# Patient Record
Sex: MALE | Race: WHITE | Employment: OTHER | URBAN - METROPOLITAN AREA
[De-identification: names, ages, dates, MRNs, and addresses within clinical notes are randomized per-mention and may not be internally consistent; named-entity substitution may affect disease eponyms.]

---

## 2017-10-10 ENCOUNTER — TELEPHONE (OUTPATIENT)
Dept: INTERNAL MEDICINE CLINIC | Age: 67
End: 2017-10-10

## 2017-10-10 NOTE — TELEPHONE ENCOUNTER
Please call patient and find out if he has a new PCP. Last seen by Mihaela Mckeon in August 2015. If so, please process him out.

## 2020-07-07 ENCOUNTER — HOSPITAL ENCOUNTER (OUTPATIENT)
Dept: PHYSICAL THERAPY | Age: 70
Discharge: HOME | End: 2020-07-07
Payer: MEDICARE

## 2020-07-07 PROCEDURE — 97530 THERAPEUTIC ACTIVITIES: CPT

## 2020-07-07 PROCEDURE — 97162 PT EVAL MOD COMPLEX 30 MIN: CPT

## 2020-07-07 NOTE — PROGRESS NOTES
PT DAILY TREATMENT NOTE 10-18    Patient Name: Jayde Thakkar. Date:2020  : 1950  [x]  Patient  Verified  Payor: BLUE CROSS MEDICARE / Plan: [place] / Product Type: Managed Care Medicare /    In time:4:20  Out time:5:00  Total Treatment Time (min): 40  Visit #: 1 of 10      Medicare/BCBS Only   Total Timed Codes (min):  15 1:1 Treatment Time:  40       Treatment Area: Right shoulder pain [M25.511]     SUBJECTIVE  Pain Level (0-10 scale): 210  Any medication changes, allergies to medications, adverse drug reactions, diagnosis change, or new procedure performed?: [x] No    [] Yes (see summary sheet for update)  Subjective functional status/changes:   [x] See paper initial evaluation form in patient chart. OBJECTIVE    25 min [x]Eval                  []Re-Eval     15 min Therapeutic Activity:  [] See flow sheet : diagnosis; prognosis; HEP given and reviewed with Pt   Rationale: increase ROM and increase strength to improve the patients ability to be able to raise right UE overhead without increased pain    With   [] TE   [x] TA   [] neuro   [] other: Patient Education: [x] Review HEP    [] Progressed/Changed HEP based on:   [] positioning   [] body mechanics   [] transfers   [] heat/ice application    [] other:      Other Objective/Functional Measures: FOTO 46 pts     Pain Level (0-10 scale) post treatment: 210    ASSESSMENT/Changes in Function:     Patient will continue to benefit from skilled PT services to address functional mobility deficits, address ROM deficits, address strength deficits, analyze and address soft tissue restrictions, analyze and cue movement patterns, analyze and modify body mechanics/ergonomics, assess and modify postural abnormalities and instruct in home and community integration to attain remaining goals.      [x]  See Plan of Care  []  See progress note/recertification  []  See Discharge Summary         PLAN  []  Upgrade activities as tolerated [x]  Continue plan of care  []  Update interventions per flow sheet       []  Discharge due to:_  []  Other:_      Db Hunt, PT 7/7/2020  5:47 PM

## 2020-07-07 NOTE — PROGRESS NOTES
[facility]  [address]  [place], [address]  [phone] fax    Plan of Care/ Statement of Necessity for Physical Therapy Services    Patient name: Peter Dong. Start of Care: 2020   Referral source: Paolo Mcconnell MD : 1950    Medical Diagnosis: Right shoulder pain [M25.511]  Payor: BLUE CROSS MEDICARE / Plan: [place] [employer] CR[place] MEDIC[place] PPO / Product Type: Managed Care Medicare /  Onset Date:2020    Treatment Diagnosis: Right Shoulder pain   Prior Hospitalization: see medical history Provider#: [id number]   Medications: Verified on Patient summary List    Comorbidities: BMI > 30; COPD; HTN; hx Polio; Latex allergy   Prior Level of Function: Retired; lives with spouse; functionally independent; uses [facility] for balance      The Plan of Care and following information is based on the information from the initial evaluation. Assessment/ key information: Pt is a 65 y.o. male who presents with c/o right superior shoulder pain that has persisted since possibly 2020. Pt was a fair historian, reporting that he tripped and fell over a box and landed on outstretched right arm, but uncertain of date of occurrence. Pt notes self-treating with rest and noted reduction in overall pain but continued difficulty with raising right arm overhead, lifting objects, and lying on right side at times. Upon exam, Pt exhibited slouched posture, corrected with verbal cueing; limited right shoulder AROM with pain, clavicular and scapular hypomobility, and impaired strength. Pt would benefit from skilled PT to address above deficits to improve Pt's function and ability to return to premorbid status without pain or limitations.     Evaluation Complexity History MEDIUM  Complexity : 1-2 comorbidities / personal factors will impact the outcome/ POC ; Examination MEDIUM Complexity : 3 Standardized tests and measures addressing body structure, function, activity limitation and / or participation in recreation  ;Presentation MEDIUM Complexity : Evolving with changing characteristics  ; Clinical Decision Making MEDIUM Complexity : FOTO score of 26-74  Overall Complexity Rating: MEDIUM  Problem List: pain affecting function, decrease ROM, decrease strength, edema affecting function, decrease ADL/ functional abilitiies, decrease activity tolerance and decrease flexibility/ joint mobility   Treatment Plan may include any combination of the following: Therapeutic exercise, Therapeutic activities, Neuromuscular re-education, Physical agent/modality, Manual therapy, Patient education, Self Care training and Functional mobility training  Patient / Family readiness to learn indicated by: asking questions and interest  Persons(s) to be included in education: patient (P)  Barriers to Learning/Limitations: None  Patient Goal (s): Arm recovery.   Patient Self Reported Health Status: good  Rehabilitation Potential: good    Short Term Goals: To be accomplished in 1 weeks:  Goal: Pt to be compliant with initial HEP to improve right shoulder mobility for ease of raising arm overhead. Status at last note/certification: Established and reviewed with Pt  Long Term Goals: To be accomplished in 5 weeks:  Goal: Pt to increase right shoulder Flex/ABD AROM to at least 130 deg without increased pain for ease reaching into cabinets, onto upper shelves. Status at last note/certification: Flex 371 deg, ABD 90 deg - with pain  Goal: Pt to increase right shoulder strength in Flex/ABD, ER to 5/5 grossly without pain for ease of lifting moderate weight to move boxes in home. Status at last note/certification: 4/5 grossly with pain  Goal: Pt to report < 3/10 pain at worst to increase ease with ADLs. Status at last note/certification: 0/43 pain at worst  Goal: Pt to report FOTO score of 68 pts to show improved function and quality of life.   Status at last note/certification: FOTO 46 pts     Frequency / Duration: Patient to be seen 2 times per week for 5 weeks. Patient/ Caregiver education and instruction: Diagnosis, prognosis, exercises   [x]  Plan of care has been reviewed with PTA    Certification Period: 7/7/20 - 8/5/20  Natalie Hunt, PT 7/7/2020 5:54 PM    ________________________________________________________________________    I certify that the above Therapy Services are being furnished while the patient is under my care. I agree with the treatment plan and certify that this therapy is necessary.     [de-identified] Signature:____________________  Date:____________Time: _________    Please sign and return to [facility] [address] Andrey Caballero [address] [place] [address] [address]  [phone] fax

## 2020-07-10 ENCOUNTER — HOSPITAL ENCOUNTER (OUTPATIENT)
Dept: PHYSICAL THERAPY | Age: 70
Discharge: HOME | End: 2020-07-10
Payer: MEDICARE

## 2020-07-10 PROCEDURE — 97110 THERAPEUTIC EXERCISES: CPT

## 2020-07-10 NOTE — PROGRESS NOTES
PT DAILY TREATMENT NOTE 10-18    Patient Name: Ellen Evangelista. Date:7/10/2020  : 1950  [x]  Patient  Verified  Payor: BLUE CROSS MEDICARE / Plan: [place] BL[place]OSS MEDIC[place] PPO / Product Type: Managed Care Medicare /    In time:9:30  Out time:10:16  Total Treatment Time (min): 46  Visit #: 2 of 10    Medicare/BCBS Only   Total Timed Codes (min):  36 1:1 Treatment Time:  15       Treatment Area: Right shoulder pain [M25.511]    SUBJECTIVE  Pain Level (0-10 scale): 2-3/10  Any medication changes, allergies to medications, adverse drug reactions, diagnosis change, or new procedure performed?: [x] No    [] Yes (see summary sheet for update)  Subjective functional status/changes:   [] No changes reported  \"I just have a little pain just from rushing this morning. \"    OBJECTIVE    Modality rationale: decrease inflammation and decrease pain to improve the patients ability to raise arm with ease   Min Type Additional Details    [] Estim:  []Unatt       []IFC  []Premod                        []Other:  []w/ice   []w/heat  Position:  Location:    [] Estim: []Att    []TENS instruct  []NMES                    []Other:  []w/US   []w/ice   []w/heat  Position:  Location:    []  Traction: [] Cervical       []Lumbar                       [] Prone          []Supine                       []Intermittent   []Continuous Lbs:  [] before manual  [] after manual    []  Ultrasound: []Continuous   [] Pulsed                           []1MHz   []3MHz W/cm2:  Location:    []  Iontophoresis with dexamethasone         Location: [] Take home patch   [] In clinic   10 [x]  Ice     []  heat  []  Ice massage  []  Laser   []  Anodyne Position:seated  Location: right shoulder    []  Laser with stim  []  Other:  Position:  Location:    []  Vasopneumatic Device Pressure:       [] lo [] med [] hi   Temperature: [] lo [] med [] hi   [x] Skin assessment post-treatment:  [x]intact []redness- no adverse reaction    []redness  adverse reaction:     36 min Therapeutic Exercise:  [] See flow sheet :   Rationale: increase ROM and increase strength to improve the patients ability to reach overhead without difficulty          With   [] TE   [] TA   [] neuro   [] other: Patient Education: [x] Review HEP    [] Progressed/Changed HEP based on:   [] positioning   [] body mechanics   [] transfers   [] heat/ice application    [] other:      Other Objective/Functional Measures: Pt 30 minutes late for appt. Initiated treatment program per flow sheet - abbreviated. Pain Level (0-10 scale) post treatment: 0.5/10    ASSESSMENT/Changes in Function: Pt arrived late thus time constraints on program completion. Pt required constant cueing to stay on task and to perform exercises properly. Pt advised to inform PT if pain levels increased above 5/10 and to not force movements if painful. Applied cold pack to assist with pain relief. Pt able to report reduction in pain level after modalities. Pt left session in no apparent distress. Patient will continue to benefit from skilled PT services to address functional mobility deficits, address ROM deficits, address strength deficits, analyze and address soft tissue restrictions, analyze and cue movement patterns, analyze and modify body mechanics/ergonomics, assess and modify postural abnormalities and instruct in home and community integration to attain remaining goals. []  See Plan of Care  []  See progress note/recertification  []  See Discharge Summary         Progress towards goals / Updated goals:  Short Term Goals: To be accomplished in 1 weeks:  Goal: Pt to be compliant with initial HEP to improve right shoulder mobility for ease of raising arm overhead. Status at last note/certification: Established and reviewed with Pt  Current status: progressing - Pt has initiated HEP  Long Term Goals:  To be accomplished in 5 weeks:  Goal: Pt to increase right shoulder Flex/ABD AROM to at least 130 deg without increased pain for ease reaching into cabinets, onto upper shelves. Status at last note/certification: Flex 089 deg, ABD 90 deg - with pain  Goal: Pt to increase right shoulder strength in Flex/ABD, ER to 5/5 grossly without pain for ease of lifting moderate weight to move boxes in home. Status at last note/certification: 4/5 grossly with pain  Goal: Pt to report < 3/10 pain at worst to increase ease with ADLs. Status at last note/certification: 7/99 pain at worst  Goal: Pt to report FOTO score of 68 pts to show improved function and quality of life.   Status at last note/certification: FOTO 46 pts     PLAN  []  Upgrade activities as tolerated     [x]  Continue plan of care  []  Update interventions per flow sheet       []  Discharge due to:_  []  Other:_      Karen Hunt, PT 7/10/2020  9:28 AM    Future Appointments   Date Time Provider Meggan Mcclure   7/14/2020  3:45 PM Kristy Oreilly, PT [facility]   7/16/2020  3:00 PM Kristy Oreilly, PT [facility]   7/21/2020  2:15 PM Verlon Canal [facility]   7/23/2020  2:15 PM Kristy Oreilly, PT [facility]   7/28/2020  2:15 PM Verlon Canal [facility]   7/30/2020  2:15 PM Kristy Oreilly, PT [facility]

## 2020-07-14 ENCOUNTER — HOSPITAL ENCOUNTER (OUTPATIENT)
Dept: PHYSICAL THERAPY | Age: 70
Discharge: HOME | End: 2020-07-14
Payer: MEDICARE

## 2020-07-14 PROCEDURE — 97140 MANUAL THERAPY 1/> REGIONS: CPT

## 2020-07-14 PROCEDURE — 97110 THERAPEUTIC EXERCISES: CPT

## 2020-07-14 NOTE — PROGRESS NOTES
PT DAILY TREATMENT NOTE 10-18    Patient Name: Bob Paige. Date:2020  : 1950  [x]  Patient  Verified  Payor: BLUE CROSS MEDICARE / Plan: [place] [employer] CROSS MEDICARE PPO / Product Type: Managed Care Medicare /    In time:345  Out time:445  Total Treatment Time (min): 60  Visit #: 3 of 10    Medicare/BCBS Only   Total Timed Codes (min):  50 1:1 Treatment Time:  47       Treatment Area: Right shoulder pain [M25.511]    SUBJECTIVE  Pain Level (0-10 scale): 2  Any medication changes, allergies to medications, adverse drug reactions, diagnosis change, or new procedure performed?: [x] No    [] Yes (see summary sheet for update)  Subjective functional status/changes:   [] No changes reported  I feel on Saturday. My cane got caught between my legs and I landed on the Right side.      OBJECTIVE    Modality rationale: decrease inflammation and decrease pain to improve the patients ability to reduce swelling after exercises   Min Type Additional Details    [] Estim:  []Unatt       []IFC  []Premod                        []Other:  []w/ice   []w/heat  Position:  Location:    [] Estim: []Att    []TENS instruct  []NMES                    []Other:  []w/US   []w/ice   []w/heat  Position:  Location:    []  Traction: [] Cervical       []Lumbar                       [] Prone          []Supine                       []Intermittent   []Continuous Lbs:  [] before manual  [] after manual    []  Ultrasound: []Continuous   [] Pulsed                           []1MHz   []3MHz W/cm2:  Location:    []  Iontophoresis with dexamethasone         Location: [] Take home patch   [] In clinic   10 [x]  Ice     []  heat  []  Ice massage  []  Laser   []  Anodyne Position:seated  Location:Right shoulder    []  Laser with stim  []  Other:  Position:  Location:    []  Vasopneumatic Device Pressure:       [] lo [] med [] hi   Temperature: [] lo [] med [] hi   [] Skin assessment post-treatment:  []intact []redness- no adverse reaction []redness  adverse reaction:     37 min Therapeutic Exercise:  [x] See flow sheet :   Rationale: increase ROM and increase strength to improve the patients ability to improve ease of overhead tasks    13 min Manual Therapy:  Supine Right shoulder manual stretching into elevation with long axis distraction, oscillations, STM/TrP release to Right UT, sidelying STM/TrP release to Right medial scapular border/infraspinatus   Rationale: decrease pain, increase ROM and increase tissue extensibility to improve ease of ADLs       With   [] TE   [] TA   [] neuro   [] other: Patient Education: [x] Review HEP    [] Progressed/Changed HEP based on:   [] positioning   [] body mechanics   [] transfers   [] heat/ice application    [] other:      Other Objective/Functional Measures: added in manual interventions due to recent fall; increased Right shoulder pain during sidelying abduction at 60 degrees     Pain Level (0-10 scale) post treatment: 1    ASSESSMENT/Changes in Function: Despite recent fall Patient demonstrates decent Right shoulder PROM during manual interventions, however goes guard (he admits to guarding even when not in pain). Good AAROM noted during shoulder ladder flexion. Cueing needed for proper posture awareness during resistance band activities. Patient will continue to benefit from skilled PT services to modify and progress therapeutic interventions, address functional mobility deficits, address ROM deficits, address strength deficits, analyze and address soft tissue restrictions, analyze and cue movement patterns, analyze and modify body mechanics/ergonomics, assess and modify postural abnormalities and instruct in home and community integration to attain remaining goals.      []  See Plan of Care  []  See progress note/recertification  []  See Discharge Summary         Progress towards goals / Updated goals:  Short Term Goals: To be accomplished in 1 weeks:  Goal: Pt to be compliant with initial HEP to improve right shoulder mobility for ease of raising arm overhead. Status at last note/certification: Established and reviewed with Pt  Current status: progressing - Pt has initiated HEP  Long Term Goals: To be accomplished in 5 weeks:  Goal: Pt to increase right shoulder Flex/ABD AROM to at least 130 deg without increased pain for ease reaching into cabinets, onto upper shelves. Status at last note/certification: Flex 705 deg, ABD 90 deg - with pain  Goal: Pt to increase right shoulder strength in Flex/ABD, ER to 5/5 grossly without pain for ease of lifting moderate weight to move boxes in home. Status at last note/certification: 4/5 grossly with pain  Goal: Pt to report < 3/10 pain at worst to increase ease with ADLs. Status at last note/certification: 6/10 pain at worst  Goal: Pt to report FOTO score of 68 pts to show improved function and quality of life.   Status at last note/certification: OEVC 13 FBJ     PLAN  [x]  Upgrade activities as tolerated     [x]  Continue plan of care  []  Update interventions per flow sheet       []  Discharge due to:_  []  Other:_      Cedric Fenton PT 7/14/2020  3:45 PM    Future Appointments   Date Time Provider Meggan Mcclure   7/16/2020  3:00 PM Souleymane Champagne, PT [facility]   7/21/2020  2:15 PM Haritha Cedeno [facility]   7/23/2020  2:15 PM Souleymane Champagne, PT [facility]   7/28/2020  2:15 PM Haritha Cedeno [facility]   7/30/2020  2:15 PM Souleymane Champagne, PT [facility]

## 2020-07-16 ENCOUNTER — HOSPITAL ENCOUNTER (OUTPATIENT)
Dept: PHYSICAL THERAPY | Age: 70
Discharge: HOME | End: 2020-07-16
Payer: MEDICARE

## 2020-07-16 PROCEDURE — 97110 THERAPEUTIC EXERCISES: CPT

## 2020-07-16 PROCEDURE — 97140 MANUAL THERAPY 1/> REGIONS: CPT

## 2020-07-16 NOTE — PROGRESS NOTES
PT DAILY TREATMENT NOTE 10-18    Patient Name: Ellen Evangelista. Date:2020  : 1950  [x]  Patient  Verified  Payor: BLUE CROSS MEDICARE / Plan: [place]IC[place] PPO / Product Type: Managed Care Medicare /    In time:305  Out time:410  Total Treatment Time (min): 72  Visit #: 4 of 10    Medicare/BCBS Only   Total Timed Codes (min):  55 1:1 Treatment Time:  42       Treatment Area: Right shoulder pain [M25.511]    SUBJECTIVE  Pain Level (0-10 scale): 1-2  Any medication changes, allergies to medications, adverse drug reactions, diagnosis change, or new procedure performed?: [x] No    [] Yes (see summary sheet for update)  Subjective functional status/changes:   [] No changes reported  I had to take an Advil to fall asleep.      OBJECTIVE    Modality rationale: decrease inflammation and decrease pain to improve the patients ability to reduce shoulder pain after exercises   Min Type Additional Details    [] Estim:  []Unatt       []IFC  []Premod                        []Other:  []w/ice   []w/heat  Position:  Location:    [] Estim: []Att    []TENS instruct  []NMES                    []Other:  []w/US   []w/ice   []w/heat  Position:  Location:    []  Traction: [] Cervical       []Lumbar                       [] Prone          []Supine                       []Intermittent   []Continuous Lbs:  [] before manual  [] after manual    []  Ultrasound: []Continuous   [] Pulsed                           []1MHz   []3MHz W/cm2:  Location:    []  Iontophoresis with dexamethasone         Location: [] Take home patch   [] In clinic   10 [x]  Ice     []  heat  []  Ice massage  []  Laser   []  Anodyne Position:seated  Location:Right shoulder    []  Laser with stim  []  Other:  Position:  Location:    []  Vasopneumatic Device Pressure:       [] lo [] med [] hi   Temperature: [] lo [] med [] hi   [] Skin assessment post-treatment:  []intact []redness- no adverse reaction    []redness  adverse reaction:     43 min Therapeutic Exercise:  [x] See flow sheet :   Rationale: increase ROM and increase strength to improve the patients ability to perform ADLs, ease of household tasks    12 min Manual Therapy:  Supine Right shoulder manual stretching all planes with long axis distraction and oscillations, grade I-II Right GHJ mobs inferior, lateral subscapularis release, sidelying Right scapular manual stretching   Rationale: decrease pain, increase ROM and increase tissue extensibility to improve shoulder mobility with functional tasks        With   [] TE   [] TA   [] neuro   [] other: Patient Education: [x] Review HEP    [] Progressed/Changed HEP based on:   [] positioning   [] body mechanics   [] transfers   [] heat/ice application    [] other:      Other Objective/Functional Measures: added sidelying flexion      Pain Level (0-10 scale) post treatment: 1    ASSESSMENT/Changes in Function: Patient demonstrates improving passive and active mobility this afternoon. In sidelying he reports some increased shoulder pain with hand resting on stomach after external rotation exercise. He does admit to taking over the counter Aspirin to reduce pain in order to improve tolerance to sleeping. Patient will continue to benefit from skilled PT services to modify and progress therapeutic interventions, address functional mobility deficits, address ROM deficits, address strength deficits, analyze and address soft tissue restrictions, analyze and cue movement patterns, analyze and modify body mechanics/ergonomics, assess and modify postural abnormalities and instruct in home and community integration to attain remaining goals. []  See Plan of Care  []  See progress note/recertification  []  See Discharge Summary         Progress towards goals / Updated goals:  Short Term Goals: To be accomplished in 1 weeks:  Goal: Pt to be compliant with initial HEP to improve right shoulder mobility for ease of raising arm overhead.   Status at last note/certification: Established and reviewed with Pt  Current status: progressing - Pt has initiated HEP  Long Term Goals: To be accomplished in 5 weeks:  Goal: Pt to increase right shoulder Flex/ABD AROM to at least 130 deg without increased pain for ease reaching into cabinets, onto upper shelves. Status at last note/certification: Flex 384 deg, ABD 90 deg - with pain  Current status: reassess closer to MD note   Goal: Pt to increase right shoulder strength in Flex/ABD, ER to 5/5 grossly without pain for ease of lifting moderate weight to move boxes in home. Status at last note/certification: 4/5 grossly with pain  Current status: reassess closer to MD note    Goal: Pt to report < 3/10 pain at worst to increase ease with ADLs. Status at last note/certification: 6/10 pain at worst  Current status: not met, 8/10 (recent fall)  Goal: Pt to report FOTO score of 68 pts to show improved function and quality of life.   Status at last note/certification: BUEJ 73 PRE  Current status: reassess at MD note     PLAN  [x]  Upgrade activities as tolerated     [x]  Continue plan of care  []  Update interventions per flow sheet       []  Discharge due to:_  []  Other:_      Minna Rain, PT 7/16/2020  3:02 PM    Future Appointments   Date Time Provider Meggan Mcclure   7/21/2020  2:15 PM Una Lewis, PT [facility] [address]   7/23/2020  2:15 PM Una Lewis, PT [facility] [address]   7/28/2020  2:15 PM Lachelle [address]   7/30/2020  2:15 PM Una Lewis, PT [facility] [address]

## 2020-07-21 ENCOUNTER — HOSPITAL ENCOUNTER (OUTPATIENT)
Dept: PHYSICAL THERAPY | Age: 70
Discharge: HOME | End: 2020-07-21
Payer: MEDICARE

## 2020-07-21 PROCEDURE — 97110 THERAPEUTIC EXERCISES: CPT

## 2020-07-21 PROCEDURE — 97140 MANUAL THERAPY 1/> REGIONS: CPT

## 2020-07-21 NOTE — PROGRESS NOTES
PT DAILY TREATMENT NOTE 10-18    Patient Name: Anabela Sousa. Date:2020  : 1950  [x]  Patient  Verified  Payor: BLUE CROSS MEDICARE / Plan: [place] / Product Type: Managed Care Medicare /    In time:211  Out time:312  Total Treatment Time (min): 61  Visit #: 5 of 10    Medicare/BCBS Only   Total Timed Codes (min):  51 1:1 Treatment Time:  49       Treatment Area: Right shoulder pain [M25.511]    SUBJECTIVE  Pain Level (0-10 scale): 1-2  Any medication changes, allergies to medications, adverse drug reactions, diagnosis change, or new procedure performed?: [x] No    [] Yes (see summary sheet for update)  Subjective functional status/changes:   [] No changes reported  Right now it's not too bad but it was bothering me earlier.      OBJECTIVE    Modality rationale: decrease inflammation and decrease pain to improve the patients ability to reduce shoulder pain after exercises    Min Type Additional Details    [] Estim:  []Unatt       []IFC  []Premod                        []Other:  []w/ice   []w/heat  Position:  Location:    [] Estim: []Att    []TENS instruct  []NMES                    []Other:  []w/US   []w/ice   []w/heat  Position:  Location:    []  Traction: [] Cervical       []Lumbar                       [] Prone          []Supine                       []Intermittent   []Continuous Lbs:  [] before manual  [] after manual    []  Ultrasound: []Continuous   [] Pulsed                           []1MHz   []3MHz W/cm2:  Location:    []  Iontophoresis with dexamethasone         Location: [] Take home patch   [] In clinic   10 [x]  Ice     []  heat  []  Ice massage  []  Laser   []  Anodyne Position:seated  Location:Right shoulder    []  Laser with stim  []  Other:  Position:  Location:    []  Vasopneumatic Device Pressure:       [] lo [] med [] hi   Temperature: [] lo [] med [] hi   [] Skin assessment post-treatment:  []intact []redness- no adverse reaction    []redness  adverse reaction:     39 min Therapeutic Exercise:  [x] See flow sheet :   Rationale: increase ROM and increase strength to improve the patients ability to perform ADLs, improve ease of reaching overhead    12 min Manual Therapy:  Supine Right shoulder manual stretching all planes with long axis distraction and oscillations, grade I-II Right GHJ mobs inferior, lateral subscapularis release, sidelying Right scapular manual stretching, TrP release to Right levator scapulae   Rationale: decrease pain, increase ROM, increase tissue extensibility and decrease trigger points to improve shoulder mechanics with daily tasks      With   [] TE   [] TA   [] neuro   [] other: Patient Education: [x] Review HEP    [] Progressed/Changed HEP based on:   [] positioning   [] body mechanics   [] transfers   [] heat/ice application    [] other:      Other Objective/Functional Measures: increased repetitions; added in prone exercises     Pain Level (0-10 scale) post treatment: 1    ASSESSMENT/Changes in Function: Patient reports no increased pain in the Right shoulder during sidelying abduction, whereas it previously was painful. Some pain at end range prone rows/extension. Improving flexion AROM. Patient will continue to benefit from skilled PT services to modify and progress therapeutic interventions, address functional mobility deficits, address ROM deficits, address strength deficits, analyze and address soft tissue restrictions, analyze and cue movement patterns, analyze and modify body mechanics/ergonomics, assess and modify postural abnormalities and instruct in home and community integration to attain remaining goals. []  See Plan of Care  []  See progress note/recertification  []  See Discharge Summary         Progress towards goals / Updated goals:  Short Term Goals: To be accomplished in 1 weeks:  Goal: Pt to be compliant with initial HEP to improve right shoulder mobility for ease of raising arm overhead.   Status at last note/certification: Established and reviewed with Pt  Current status: progressing - Pt has initiated HEP  Long Term Goals: To be accomplished in 5 weeks:  Goal: Pt to increase right shoulder Flex/ABD AROM to at least 130 deg without increased pain for ease reaching into cabinets, onto upper shelves. Status at last note/certification: Flex 115 deg, ABD 90 deg - with pain  Current status: progressing, flex 114 deg, abd 57 deg  Goal: Pt to increase right shoulder strength in Flex/ABD, ER to 5/5 grossly without pain for ease of lifting moderate weight to move boxes in home. Status at last note/certification: 4/5 grossly with pain  Current status: reassess closer to MD note    Goal: Pt to report < 3/10 pain at worst to increase ease with ADLs. Status at last note/certification: 6/10 pain at worst  Current status: not met, 8/10 (recent fall)  Goal: Pt to report FOTO score of 68 pts to show improved function and quality of life.   Status at last note/certification: MFAU 96 MQM  Current status: reassess at MD note     PLAN  [x]  Upgrade activities as tolerated     [x]  Continue plan of care  []  Update interventions per flow sheet       []  Discharge due to:_  []  Other:_      Santos Kwong, PT 7/21/2020  2:10 PM    Future Appointments   Date Time Provider Meggan Mcclure   7/21/2020  2:15 PM Donna Rosado, PT [facility]   7/23/2020  2:15 PM Donna Rosado, PT [facility]   7/28/2020  2:15 PM Luna Khan [facility]   7/30/2020  2:15 PM Donna Rosado, PT [facility]

## 2020-07-23 ENCOUNTER — HOSPITAL ENCOUNTER (OUTPATIENT)
Dept: PHYSICAL THERAPY | Age: 70
Discharge: HOME | End: 2020-07-23
Payer: MEDICARE

## 2020-07-23 PROCEDURE — 97140 MANUAL THERAPY 1/> REGIONS: CPT

## 2020-07-23 PROCEDURE — 97110 THERAPEUTIC EXERCISES: CPT

## 2020-07-23 PROCEDURE — 97112 NEUROMUSCULAR REEDUCATION: CPT

## 2020-07-23 NOTE — PROGRESS NOTES
PT DAILY TREATMENT NOTE 10-18    Patient Name: Mandi Gonzalez. Date:2020  : 1950  [x]  Patient  Verified  Payor: BLUE CROSS MEDICARE / Plan: [place] [place] / Product Type: Managed Care Medicare /    In time:214  Out time:304  Total Treatment Time (min): 50  Visit #: 6 of 10    Medicare/BCBS Only   Total Timed Codes (min):  40 1:1 Treatment Time:  40       Treatment Area: Right shoulder pain [M25.511]    SUBJECTIVE  Pain Level (0-10 scale): 2  Any medication changes, allergies to medications, adverse drug reactions, diagnosis change, or new procedure performed?: [x] No    [] Yes (see summary sheet for update)  Subjective functional status/changes:   [] No changes reported  It was sore after last time but it feels like progress.      OBJECTIVE    Modality rationale: decrease inflammation and decrease pain to improve the patients ability to reduce shoulder pain following exercises    Min Type Additional Details    [] Estim:  []Unatt       []IFC  []Premod                        []Other:  []w/ice   []w/heat  Position:  Location:    [] Estim: []Att    []TENS instruct  []NMES                    []Other:  []w/US   []w/ice   []w/heat  Position:  Location:    []  Traction: [] Cervical       []Lumbar                       [] Prone          []Supine                       []Intermittent   []Continuous Lbs:  [] before manual  [] after manual    []  Ultrasound: []Continuous   [] Pulsed                           []1MHz   []3MHz W/cm2:  Location:    []  Iontophoresis with dexamethasone         Location: [] Take home patch   [] In clinic   10 [x]  Ice     []  heat  []  Ice massage  []  Laser   []  Anodyne Position:seated  Location:Right shoulder    []  Laser with stim  []  Other:  Position:  Location:    []  Vasopneumatic Device Pressure:       [] lo [] med [] hi   Temperature: [] lo [] med [] hi   [] Skin assessment post-treatment:  []intact []redness- no adverse reaction    []redness  adverse reaction: 10 min Therapeutic Exercise:  [x] See flow sheet :   Rationale: increase ROM and increase strength to improve the patients ability to improve ease of ADLs    17 min Neuromuscular Re-education:  [x]  See flow sheet :   Rationale: increase strength and improve coordination  to improve the patients ability to improve scapular stability with functional tasks    13 min Manual Therapy:  STM to Right pectorals, Supine Right shoulder manual stretching all planes with long axis distraction and oscillations, grade I-II Right GHJ mobs inferior, lateral subscapularis release, sidelying Right scapular manual stretching, TrP release to Right levator scapulae   Rationale: decrease pain, increase ROM, increase tissue extensibility and decrease trigger points to reduce pain at end ranges      With   [] TE   [] TA   [] neuro   [] other: Patient Education: [x] Review HEP    [] Progressed/Changed HEP based on:   [] positioning   [] body mechanics   [] transfers   [] heat/ice application    [] other:      Other Objective/Functional Measures: updated HEP, program     Pain Level (0-10 scale) post treatment: 1    ASSESSMENT/Changes in Function: Patient reports less pain and fewer shoulder mobility restrictions since starting physical therapy. Progressed strengthening and scapular stability today without increased pain, and Patient pleased to be progressing. Updated HEP to reflect strengthening. Patient will continue to benefit from skilled PT services to modify and progress therapeutic interventions, address functional mobility deficits, address ROM deficits, address strength deficits, analyze and address soft tissue restrictions, analyze and cue movement patterns, analyze and modify body mechanics/ergonomics, assess and modify postural abnormalities and instruct in home and community integration to attain remaining goals.      []  See Plan of Care  []  See progress note/recertification  []  See Discharge Summary         Progress towards goals / Updated goals:  Short Term Goals: To be accomplished in 1 weeks:  Goal: Pt to be compliant with initial HEP to improve right shoulder mobility for ease of raising arm overhead. Status at last note/certification: Established and reviewed with Pt  Current status: progressing - Pt has initiated HEP  Long Term Goals: To be accomplished in 5 weeks:  Goal: Pt to increase right shoulder Flex/ABD AROM to at least 130 deg without increased pain for ease reaching into cabinets, onto upper shelves. Status at last note/certification: Flex 079 deg, ABD 90 deg - with pain  Current status: progressing, flex 114 deg, abd 57 deg  Goal: Pt to increase right shoulder strength in Flex/ABD, ER to 5/5 grossly without pain for ease of lifting moderate weight to move boxes in home. Status at last note/certification: 4/5 grossly with pain  Current status: reassess closer to MD note    Goal: Pt to report < 3/10 pain at worst to increase ease with ADLs. Status at last note/certification: 6/10 pain at worst  Current status: not met, 8/10 (recent fall)  Goal: Pt to report FOTO score of 68 pts to show improved function and quality of life.   Status at last note/certification: QRKF 21 LZJ  Current status: reassess at MD note     PLAN  [x]  Upgrade activities as tolerated     [x]  Continue plan of care  []  Update interventions per flow sheet       []  Discharge due to:_  []  Other:_      Valorie Faith, PT 7/23/2020  2:20 PM    Future Appointments   Date Time Provider Meggan Mcclure   7/28/2020  2:15 PM Neri Bates, PT [facility]   7/30/2020  2:15 PM Neri Bates, PT [facility]

## 2020-07-28 ENCOUNTER — HOSPITAL ENCOUNTER (OUTPATIENT)
Dept: PHYSICAL THERAPY | Age: 70
Discharge: HOME | End: 2020-07-28
Payer: MEDICARE

## 2020-07-28 PROCEDURE — 97140 MANUAL THERAPY 1/> REGIONS: CPT

## 2020-07-28 PROCEDURE — 97110 THERAPEUTIC EXERCISES: CPT

## 2020-07-28 PROCEDURE — 97112 NEUROMUSCULAR REEDUCATION: CPT

## 2020-07-28 NOTE — PROGRESS NOTES
PT DAILY TREATMENT NOTE 10-18    Patient Name: Twan Graves. Date:2020  : 1950  [x]  Patient  Verified  Payor: BLUE CROSS MEDICARE / Plan: [facility] [employer] CROSS MEDICARE PPO / Product Type: Managed Care Medicare /    In time:214  Out time:308  Total Treatment Time (min): 54  Visit #: 7 of 10    Medicare/BCBS Only   Total Timed Codes (min):  44 1:1 Treatment Time:  44       Treatment Area: Right shoulder pain [M25.511]    SUBJECTIVE  Pain Level (0-10 scale): 2  Any medication changes, allergies to medications, adverse drug reactions, diagnosis change, or new procedure performed?: [x] No    [] Yes (see summary sheet for update)  Subjective functional status/changes:   [] No changes reported  I'm a little tired. I managed to do 2 sets of everything yesterday.      OBJECTIVE    Modality rationale: decrease inflammation and decrease pain to improve the patients ability to reduce shoulder pain, swelling after exercises    Min Type Additional Details    [] Estim:  []Unatt       []IFC  []Premod                        []Other:  []w/ice   []w/heat  Position:  Location:    [] Estim: []Att    []TENS instruct  []NMES                    []Other:  []w/US   []w/ice   []w/heat  Position:  Location:    []  Traction: [] Cervical       []Lumbar                       [] Prone          []Supine                       []Intermittent   []Continuous Lbs:  [] before manual  [] after manual    []  Ultrasound: []Continuous   [] Pulsed                           []1MHz   []3MHz W/cm2:  Location:    []  Iontophoresis with dexamethasone         Location: [] Take home patch   [] In clinic   10 [x]  Ice     []  heat  []  Ice massage  []  Laser   []  Anodyne Position:seated  Location:Right shoulder    []  Laser with stim  []  Other:  Position:  Location:    []  Vasopneumatic Device Pressure:       [] lo [] med [] hi   Temperature: [] lo [] med [] hi   [] Skin assessment post-treatment:  []intact []redness- no adverse reaction    []redness  adverse reaction:     12 min Therapeutic Exercise:  [x] See flow sheet :   Rationale: increase ROM and increase strength to improve the patients ability to improve ease of reaching, ADLs    20 min Neuromuscular Re-education:  [x]  See flow sheet :   Rationale: increase strength, improve coordination and increase proprioception  to improve the patients ability to improve shoulder mechanics, scapular stability with functional tasks    12 min Manual Therapy:  Supine manual stretching Right UE all planes with long axis distraction and oscillations, grade II-III inferior Right GHJ mobs, STM to Right pectorals/UT, sidelying scapular stretching    Rationale: decrease pain, increase ROM, increase tissue extensibility and decrease trigger points to improve shoulder mechanics with daily tasks        With   [] TE   [] TA   [] neuro   [] other: Patient Education: [x] Review HEP    [] Progressed/Changed HEP based on:   [] positioning   [] body mechanics   [] transfers   [] heat/ice application    [] other:      Other Objective/Functional Measures: increased repetitions per flow sheet     Pain Level (0-10 scale) post treatment: 1-2    ASSESSMENT/Changes in Function: Patient reports that he has gotten to the improvement level where he was prior to fall two weeks ago, if not a little bit better. But he is still not where he would like to be. MD follow up next week; we will plan to continue with skilled physical therapy, progressing strength and ROM, in order to prepare for independent management.      Patient will continue to benefit from skilled PT services to modify and progress therapeutic interventions, address functional mobility deficits, address ROM deficits, address strength deficits, analyze and address soft tissue restrictions, analyze and cue movement patterns, analyze and modify body mechanics/ergonomics, assess and modify postural abnormalities and instruct in home and community integration to attain remaining goals.     []  See Plan of Care  []  See progress note/recertification  []  See Discharge Summary         Progress towards goals / Updated goals:  Short Term Goals: To be accomplished in 1 weeks:  Goal: Pt to be compliant with initial HEP to improve right shoulder mobility for ease of raising arm overhead. Status at last note/certification: Established and reviewed with Pt  Current status: progressing - Pt has initiated HEP  Long Term Goals: To be accomplished in 5 weeks:  Goal: Pt to increase right shoulder Flex/ABD AROM to at least 130 deg without increased pain for ease reaching into cabinets, onto upper shelves. Status at last note/certification: Flex 375 deg, ABD 90 deg - with pain  Current status: progressing, flex 114 deg, abd 57 deg  Goal: Pt to increase right shoulder strength in Flex/ABD, ER to 5/5 grossly without pain for ease of lifting moderate weight to move boxes in home. Status at last note/certification: 4/5 grossly with pain  Current status: reassess closer to MD note    Goal: Pt to report < 3/10 pain at worst to increase ease with ADLs. Status at last note/certification: 6/10 pain at worst  Current status: not met, 8/10 (recent fall)  Goal: Pt to report FOTO score of 68 pts to show improved function and quality of life.   Status at last note/certification: ICCK 30 MTD  Current status: reassess at MD note     PLAN  [x]  Upgrade activities as tolerated     [x]  Continue plan of care  []  Update interventions per flow sheet       []  Discharge due to:_  []  Other:_      Vitaly Yeboah, PT 7/28/2020  2:14 PM    Future Appointments   Date Time Provider Meggan Mcclure   7/28/2020  2:15 PM Monica Shin, PT [facility]   7/30/2020  2:15 PM Monica Shin PT [facility]

## 2020-07-30 ENCOUNTER — HOSPITAL ENCOUNTER (OUTPATIENT)
Dept: PHYSICAL THERAPY | Age: 70
Discharge: HOME | End: 2020-07-30
Payer: MEDICARE

## 2020-07-30 PROCEDURE — 97140 MANUAL THERAPY 1/> REGIONS: CPT

## 2020-07-30 PROCEDURE — 97112 NEUROMUSCULAR REEDUCATION: CPT

## 2020-07-30 PROCEDURE — 97110 THERAPEUTIC EXERCISES: CPT

## 2020-07-30 NOTE — PROGRESS NOTES
PT DAILY TREATMENT NOTE 10-18    Patient Name: Elizabeth Liu. Date:2020  : 1950  [x]  Patient  Verified  Payor: BLUE CROSS MEDICARE / Plan: [facility] [place]IC[place] / Product Type: Managed Care Medicare /    In time:210  Out time:305  Total Treatment Time (min): 54  Visit #: 8 of 10    Medicare/BCBS Only   Total Timed Codes (min):  45 1:1 Treatment Time:  45       Treatment Area: Right shoulder pain [M25.511]    SUBJECTIVE  Pain Level (0-10 scale): 1  Any medication changes, allergies to medications, adverse drug reactions, diagnosis change, or new procedure performed?: [x] No    [] Yes (see summary sheet for update)  Subjective functional status/changes:   [] No changes reported  It's a little better.      OBJECTIVE    Modality rationale: decrease inflammation and decrease pain to improve the patients ability to reduce shoulder pain after exercises    Min Type Additional Details    [] Estim:  []Unatt       []IFC  []Premod                        []Other:  []w/ice   []w/heat  Position:  Location:    [] Estim: []Att    []TENS instruct  []NMES                    []Other:  []w/US   []w/ice   []w/heat  Position:  Location:    []  Traction: [] Cervical       []Lumbar                       [] Prone          []Supine                       []Intermittent   []Continuous Lbs:  [] before manual  [] after manual    []  Ultrasound: []Continuous   [] Pulsed                           []1MHz   []3MHz W/cm2:  Location:    []  Iontophoresis with dexamethasone         Location: [] Take home patch   [] In clinic   10 [x]  Ice     []  heat  []  Ice massage  []  Laser   []  Anodyne Position:seated  Location:Right shoulder    []  Laser with stim  []  Other:  Position:  Location:    []  Vasopneumatic Device Pressure:       [] lo [] med [] hi   Temperature: [] lo [] med [] hi   [] Skin assessment post-treatment:  []intact []redness- no adverse reaction    []redness  adverse reaction:     8 min Therapeutic Exercise:  [x] See flow sheet :   Rationale: increase ROM and increase strength to improve the patients ability to perform ADLs    27 min Neuromuscular Re-education:  [x]  See flow sheet :   Rationale: increase strength, improve coordination and increase proprioception  to improve the patients ability to improve scapular stability with reaching overhead, lifting    10 min Manual Therapy:  Supine manual stretching Right UE all planes with long axis distraction and oscillations, grade II-III inferior and posterior Right GHJ mobs, STM to Right pectorals/UT, sidelying scapular stretching and distraction   Rationale: decrease pain, increase ROM, increase tissue extensibility and decrease trigger points to improve shoulder mechanics with functional tasks        With   [] TE   [] TA   [] neuro   [] other: Patient Education: [x] Review HEP    [] Progressed/Changed HEP based on:   [] positioning   [] body mechanics   [] transfers   [] heat/ice application    [] other:      Other Objective/Functional Measures: added in quadruped activities for scapular stability; updated HEP in preparation for re-certification     Pain Level (0-10 scale) post treatment: 1    ASSESSMENT/Changes in Function: Patient demonstrates improving shoulder strength, and good improvement in scapular mobility this afternoon. Pain persists with overhead activities as well as abduction. Able to lift weight but not overhead. Patient will continue to benefit from skilled PT services to modify and progress therapeutic interventions, address functional mobility deficits, address ROM deficits, address strength deficits, analyze and address soft tissue restrictions, analyze and cue movement patterns, analyze and modify body mechanics/ergonomics, assess and modify postural abnormalities and instruct in home and community integration to attain remaining goals.      []  See Plan of Care  []  See progress note/recertification  []  See Discharge Summary         Progress towards goals / Updated goals:  Short Term Goals: To be accomplished in 1 weeks:  Goal: Pt to be compliant with initial HEP to improve right shoulder mobility for ease of raising arm overhead. Status at last note/certification: Established and reviewed with Pt  Current status: met  Long Term Goals: To be accomplished in 5 weeks:  Goal: Pt to increase right shoulder Flex/ABD AROM to at least 130 deg without increased pain for ease reaching into cabinets, onto upper shelves. Status at last note/certification: Flex 661 deg, ABD 90 deg - with pain  Current status: progressing, flex 114 deg, abd 57 deg  Goal: Pt to increase right shoulder strength in Flex/ABD, ER to 5/5 grossly without pain for ease of lifting moderate weight to move boxes in home. Status at last note/certification: 4/5 grossly with pain  Current status: progressing, flexion/abduction 4+/5 with some pain, ER 5/5  Goal: Pt to report < 3/10 pain at worst to increase ease with ADLs. Status at last note/certification: 6/10 pain at worst  Current status: not met, 8/10 (recent fall)  Goal: Pt to report FOTO score of 68 pts to show improved function and quality of life.   Status at last note/certification: UIJU 94 RHE  Current status: progressing, 60 pts    PLAN  [x]  Upgrade activities as tolerated     [x]  Continue plan of care  []  Update interventions per flow sheet       []  Discharge due to:_  [x]  Other:_  Re-certification next visit     Sang Downs, PT 7/30/2020  2:09 PM    Future Appointments   Date Time Provider Meggan Mcclure   7/30/2020  2:15 PM Jo De Jesus [facility]   8/4/2020  3:45 PM Seth Barksdale, PT [facility]   8/6/2020  3:45 PM Seth Barksdale PT [facility]   8/11/2020  2:15 PM Cesilia Zhang, PT [facility]   8/13/2020  2:15 PM Cesilia Zhang, PT [facility]   8/18/2020  2:15 PM Cesilia Zhang, PT [facility]   8/20/2020  2:15 PM Cesilia Zhang, PT [facility]   8/25/2020  2:15 PM Cesilia Zhang, PT [facility]   8/27/2020  2:15 PM Gene Thomas PT [facility]

## 2020-08-04 ENCOUNTER — HOSPITAL ENCOUNTER (OUTPATIENT)
Dept: PHYSICAL THERAPY | Age: 70
Discharge: HOME | End: 2020-08-04
Payer: MEDICARE

## 2020-08-04 PROCEDURE — 97140 MANUAL THERAPY 1/> REGIONS: CPT

## 2020-08-04 PROCEDURE — 97110 THERAPEUTIC EXERCISES: CPT

## 2020-08-04 PROCEDURE — 97112 NEUROMUSCULAR REEDUCATION: CPT

## 2020-08-04 NOTE — PROGRESS NOTES
[facility] THOMPSON RUIZ [facility], [address] [place] [address]  [phone] fax    Continued Plan of Care/ Re-certification for Physical Therapy Services      Patient name: Nathen Longoria Start of Care: 2020   Referral source: Davis Vega MD : 1950               Medical Diagnosis: Right shoulder pain [M25.511]  Payor: BLUE CROSS MEDICARE / Plan: [place] P[place] / Product Type: Managed Care Medicare /  Onset Date:2020               Treatment Diagnosis: Right Shoulder pain   Prior Hospitalization: see medical history Provider#: [id number]   Medications: Verified on Patient summary List    Comorbidities: BMI > 30; COPD; HTN; hx Polio; Latex allergy   Prior Level of Function: Retired; lives with spouse; functionally independent; uses SPC for balance  Visits from Start of Care: 9    Missed Visits: 0    The Plan of Care and following information is based on the patient's current status:  Goal: Pt to be compliant with initial HEP to improve right shoulder mobility for ease of raising arm overhead. Status at last note/certification: MET, reports compliance  Current Status: met    Goal: Pt to increase right shoulder Flex/ABD AROM to at least 130 deg without increased pain for ease reaching into cabinets, onto upper shelves. Status at last note/certification: progressing, flex 114 deg, abd 57 deg  Current Status: not met    Goal: Pt to increase right shoulder strength in Flex/ABD, ER to 5/5 grossly without pain for ease of lifting moderate weight to move boxes in home. Status at last note/certification: progressing, flexion/abduction 4+/5 with some pain, ER 5/5  Current Status: not met    Goal: Pt to report < 3/10 pain at worst to increase ease with ADLs. Status at last note/certification: not met, 8/10 (recent fall)  Current Status: not met    Goal: Pt to report FOTO score of 68 pts to show improved function and quality of life.   Status at last note/certification: progressing, 60 pts  Current Status: not met    Key functional changes:   AROM right shoulder IR 40 degs, ER 65 degs. Functional Gains: ROM, strength, reaching  Functional Deficits: ROM of the right shoulder is still limited, reaching above and over shoulder height. Problems/ barriers to goal attainment: none     Problem List: pain affecting function, decrease ROM, decrease strength, decrease ADL/ functional abilitiies, decrease activity tolerance, decrease flexibility/ joint mobility and decrease transfer abilities    Treatment Plan: Therapeutic exercise, Therapeutic activities, Neuromuscular re-education, Physical agent/modality, Manual therapy, Patient education, Self Care training, Functional mobility training and Home safety training     Patient Goal (s) has been updated and includes: \"have more motion in the right shoulder\"     Goals for this certification period to be accomplished in 4 weeks:  1. Pt to report FOTO score of 68 pts to show improved function and quality of life. Re-certification: 60 points  2. Pt to increase right shoulder Flex/ABD AROM to at least 130 degs, ER to 75 degs, IR to 55 degs without increased pain for ease reaching into cabinets, onto upper shelves. Re-certification: XSQM 645 YUK, ABD 57 deg, IR 40 degs, ER 65 degs. 3. Pt to report < 3/10 pain at worst to increase ease with ADLs. Re-certification: not met, 8/10 (recent fall)  4. Pt will report having no difficulty with reaching to a shelf that is at shoulder height with the right shoulder to improve ease of household tasks. Re-certification: little difficulty    Frequency / Duration: Patient to be seen 2 times per week for 4 weeks:    Assessment / Recommendations:  Pt reports/demonstrates improvements in ROM, strength, pain, and mobility since starting therapy. Pt states he did fall a few weeks ago onto his right shoulder which increased his pain but reports his pain is better overall.  Pt continues to demonstrate limitations with right shoulder ROM and strength. We will plan on continuing therapy to improve ROM, mobility, and improve the pt's ease of functional tasks. Certification Period: 8/5/2020 - 9/3/2020    Melida Overton, PT 8/4/2020 4:00 PM    ________________________________________________________________________  I certify that the above Therapy Services are being furnished while the patient is under my care. I agree with the treatment plan and certify that this therapy is necessary. [] I have read the above and request that my patient continue as recommended.   [] I have read the above report and request that my patient continue therapy with the following changes/special instructions: ______________________________________  [] I have read the above report and request that my patient be discharged from therapy    Physician's Signature:____________Date:_________TIME:________    ** Signature, Date and Time must be completed for valid certification **    Please sign and return to [facility] THOMPSON DASH [facility], [address] [place] [address]  [phone] fax

## 2020-08-04 NOTE — PROGRESS NOTES
PT DAILY TREATMENT NOTE 10-18    Patient Name: Sarath Rivas. Date:2020  : 1950  [x]  Patient  Verified  Payor: BLUE CROSS MEDICARE / Plan: [place] MEDIC[place] PPO / Product Type: Managed Care Medicare /    In time: 3:09    Out time: 4:09  Total Treatment Time (min): 60  Visit #: 9 of 10    Medicare/BCBS Only   Total Timed Codes (min): 50 1:1 Treatment Time: 50       Treatment Area: Right shoulder pain [M25.511]    SUBJECTIVE  Pain Level (0-10 scale): 0  Any medication changes, allergies to medications, adverse drug reactions, diagnosis change, or new procedure performed?: [x] No    [] Yes (see summary sheet for update)  Subjective functional status/changes:   [] No changes reported  Pt reports his shoulder is not too bad today. OBJECTIVE    Modality rationale: decrease inflammation and decrease pain to improve the patients ability to improve activity tolerance.     Min Type Additional Details    [] Estim:  []Unatt       []IFC  []Premod                        []Other:  []w/ice   []w/heat  Position:  Location:    [] Estim: []Att    []TENS instruct  []NMES                    []Other:  []w/US   []w/ice   []w/heat  Position:  Location:    []  Traction: [] Cervical       []Lumbar                       [] Prone          []Supine                       []Intermittent   []Continuous Lbs:  [] before manual  [] after manual    []  Ultrasound: []Continuous   [] Pulsed                           []1MHz   []3MHz W/cm2:  Location:    []  Iontophoresis with dexamethasone         Location: [] Take home patch   [] In clinic   10 [x]  Ice     []  heat  []  Ice massage  []  Laser   []  Anodyne Position: sitting  Location: right shoulder    []  Laser with stim  []  Other:  Position:  Location:    []  Vasopneumatic Device Pressure:       [] lo [] med [] hi   Temperature: [] lo [] med [] hi   [] Skin assessment post-treatment:  []intact []redness- no adverse reaction    []redness  adverse reaction:     10 min Therapeutic Exercise:  [x] See flow sheet :   Rationale: increase ROM and increase strength to improve the patients ability to perform ADLs    28 min Neuromuscular Re-education:  [x]  See flow sheet :   Rationale: increase strength, improve coordination and increase proprioception  to improve the patients ability to improve ease of reaching     12 min Manual Therapy: in supine: gentle right GHJ distraction, grade 2 right GHJ inferior and posterior mobs, DTM right pec minor/biceps, PROM with gentle overpressure into right shoulder flex/ABD/ER/IR after performing above   Rationale: decrease pain, increase ROM, increase tissue extensibility and decrease trigger points to improve stability needed for functional tasks. With   [] TE   [] TA   [] neuro   [] other: Patient Education: [x] Review HEP    [] Progressed/Changed HEP based on:   [] positioning   [] body mechanics   [] transfers   [] heat/ice application    [] other:      Other Objective/Functional Measures: See goals below. Added tband right shoulder ER/IR to improve strength in the UEs. AROM right shoulder IR 40 degs, ER 65 degs. Functional Gains: ROM, strength, reaching  Functional Deficits: ROM of the right shoulder is still limited, reaching above and over shoulder height. Pain Level (0-10 scale) post treatment: 0    ASSESSMENT/Changes in Function: See re-certification. Pt reports/demonstrates improvements in ROM, strength, pain, and mobility since starting therapy. Pt states he did fall a few weeks ago onto his right shoulder which increased his pain but reports his pain is better overall. Pt continues to demonstrate limitations with right shoulder ROM and strength. We will plan on continuing therapy to improve ROM, mobility, and improve the pt's ease of functional tasks.      Patient will continue to benefit from skilled PT services to modify and progress therapeutic interventions, address functional mobility deficits, address ROM deficits, address strength deficits, analyze and address soft tissue restrictions, analyze and cue movement patterns, analyze and modify body mechanics/ergonomics, assess and modify postural abnormalities and instruct in home and community integration to attain remaining goals. []  See Plan of Care  [x]  See progress note/recertification  []  See Discharge Summary         Progress towards goals / Updated goals:  Short Term Goals: To be accomplished in 1 weeks:  Goal: Pt to be compliant with initial HEP to improve right shoulder mobility for ease of raising arm overhead. Status at last note/certification: Established and reviewed with Pt  Current status: met, reports compliance  Long Term Goals: To be accomplished in 5 weeks:  Goal: Pt to increase right shoulder Flex/ABD AROM to at least 130 deg without increased pain for ease reaching into cabinets, onto upper shelves. Status at last note/certification: Flex 107 deg, ABD 90 deg - with pain  Current status: progressing, flex 114 deg, abd 57 deg  Goal: Pt to increase right shoulder strength in Flex/ABD, ER to 5/5 grossly without pain for ease of lifting moderate weight to move boxes in home. Status at last note/certification: 4/5 grossly with pain  Current status: progressing, flexion/abduction 4+/5 with some pain, ER 5/5  Goal: Pt to report < 3/10 pain at worst to increase ease with ADLs. Status at last note/certification: 6/10 pain at worst  Current status: not met, 8/10 (recent fall)  Goal: Pt to report FOTO score of 68 pts to show improved function and quality of life.   Status at last note/certification: LADE 15 IQD  Current status: progressing, 60 pts    PLAN  [x]  Upgrade activities as tolerated     [x]  Continue plan of care  [x]  Update interventions per flow sheet       []  Discharge due to:_  []  Other:_     Zuri Alatorre, PT 8/4/2020  3:59 PM    Future Appointments   Date Time Provider Meggan Mcclure   8/6/2020  3:45 PM Deepak Ignacio, PT [facility]   8/11/2020  2:15 PM Mario London, [facility]   8/13/2020  2:15 PM Mario London, [facility]   8/18/2020  2:15 PM [facility]   8/20/2020  2:15 PM [address] [facility]   8/25/2020  2:15 PM [facility]   8/27/2020  2:15 PM Mario London, [facility]

## 2020-08-06 ENCOUNTER — HOSPITAL ENCOUNTER (OUTPATIENT)
Dept: PHYSICAL THERAPY | Age: 70
Discharge: HOME | End: 2020-08-06
Payer: MEDICARE

## 2020-08-06 PROCEDURE — 97112 NEUROMUSCULAR REEDUCATION: CPT

## 2020-08-06 PROCEDURE — 97140 MANUAL THERAPY 1/> REGIONS: CPT

## 2020-08-06 PROCEDURE — 97110 THERAPEUTIC EXERCISES: CPT

## 2020-08-06 NOTE — PROGRESS NOTES
PT DAILY TREATMENT NOTE 10-18    Patient Name: Kadie Musa. Date:2020  : 1950  [x]  Patient  Verified  Payor: BLUE CROSS MEDICARE / Plan: [place] BLUE [place]OSS MEDICARE PPO / Product Type: Managed Care Medicare /    In time: 3:50    Out time: 4:28  Total Treatment Time (min): 38  Visit #: 1 of 8    Medicare/BCBS Only   Total Timed Codes (min): 38 1:1 Treatment Time: 38       Treatment Area: Right shoulder pain [M25.511]    SUBJECTIVE  Pain Level (0-10 scale): 1  Any medication changes, allergies to medications, adverse drug reactions, diagnosis change, or new procedure performed?: [x] No    [] Yes (see summary sheet for update)  Subjective functional status/changes:   [] No changes reported  Pt states his shoulder isn't doing too bad today. OBJECTIVE    11 min Therapeutic Exercise:  [x] See flow sheet :   Rationale: increase ROM and increase strength to improve the patients ability to perform ADLs    15 min Neuromuscular Re-education:  [x]  See flow sheet :   Rationale: increase strength, improve coordination and increase proprioception  to improve the patients ability to improve ease of reaching     12 min Manual Therapy: in supine: gentle right GHJ distraction, grade 2-3 right GHJ inferior and posterior mobs, DTM right pec minor/biceps, PROM with gentle overpressure into right shoulder flex/ABD/ER/IR after performing above   Rationale: decrease pain, increase ROM, increase tissue extensibility and decrease trigger points to improve stability needed for functional tasks. With   [] TE   [] TA   [] neuro   [] other: Patient Education: [x] Review HEP    [] Progressed/Changed HEP based on:   [] positioning   [] body mechanics   [] transfers   [] heat/ice application    [] other:      Other Objective/Functional Measures: Mild tenderness reported to the right bicep/pec minor region with manual interventions today. Added doorway stretch to improve tightness in the anterior shoulder.      Pain Level (0-10 scale) post treatment: 0    ASSESSMENT/Changes in Function: Reported no pain post session and denied ice today. Pt mildly challenged with stabiltiy with quadriped exercises. We will plan on continuing therapy to improve strength and pain. Continue POC as tolerated. Patient will continue to benefit from skilled PT services to modify and progress therapeutic interventions, address functional mobility deficits, address ROM deficits, address strength deficits, analyze and address soft tissue restrictions, analyze and cue movement patterns, analyze and modify body mechanics/ergonomics, assess and modify postural abnormalities and instruct in home and community integration to attain remaining goals. []  See Plan of Care  []  See progress note/recertification  []  See Discharge Summary         Progress towards goals / Updated goals:  Goals for this certification period to be accomplished in 4 weeks:  1. Pt to report FOTO score of 68 pts to show improved function and quality of life. Re-certification: 60 points  2. Pt to increase right shoulder Flex/ABD AROM to at least 130 degs, ER to 75 degs, IR to 55 degs without increased pain for ease reaching into cabinets, onto upper shelves. Re-certification: LXLJ 894 YCN, ABD 57 deg, IR 40 degs, ER 65 degs.   3. Pt to report < 3/10 pain at worst to increase ease with ADLs. Re-certification: not met, 8/10 (recent fall)  4. Pt will report having no difficulty with reaching to a shelf that is at shoulder height with the right shoulder to improve ease of household tasks.    Re-certification: little difficulty    PLAN  [x]  Upgrade activities as tolerated     [x]  Continue plan of care  [x]  Update interventions per flow sheet       []  Discharge due to:_  []  Other:_     Kacy Brush PT 8/6/2020  3:53 PM    Future Appointments   Date Time Provider Meggan Mcclure   8/11/2020  2:15 PM Liang Means, PT [facility]   8/13/2020  2:15 PM Liang Means, PT [facility]   8/18/2020  2:15 PM Alta , PT [facility]   8/20/2020  2:15 PM Tess Gee [facility]   8/25/2020  2:15 PM Daniella Mtz [facility]   8/27/2020  2:15 PM Nash , PT [facility]

## 2020-08-11 ENCOUNTER — HOSPITAL ENCOUNTER (OUTPATIENT)
Dept: PHYSICAL THERAPY | Age: 70
Discharge: HOME | End: 2020-08-11
Payer: MEDICARE

## 2020-08-11 PROCEDURE — 97140 MANUAL THERAPY 1/> REGIONS: CPT

## 2020-08-11 PROCEDURE — 97112 NEUROMUSCULAR REEDUCATION: CPT

## 2020-08-11 NOTE — PROGRESS NOTES
PT DAILY TREATMENT NOTE 10-18    Patient Name: Peter Dong. Date:2020  : 1950  [x]  Patient  Verified  Payor: Sherren Dallas / Plan: [place] MED[place] PART A & B / Product Type: Medicare /    In time:214  Out time:257  Total Treatment Time (min): 43  Visit #: 2 of 8    Medicare/BCBS Only   Total Timed Codes (min):  43 1:1 Treatment Time:  43       Treatment Area: Right shoulder pain [M25.511]    SUBJECTIVE  Pain Level (0-10 scale): 1  Any medication changes, allergies to medications, adverse drug reactions, diagnosis change, or new procedure performed?: [x] No    [] Yes (see summary sheet for update)  Subjective functional status/changes:   [] No changes reported  It feels good right now. I think I might have pushed it too hard last time.      OBJECTIVE    Modality rationale:    Min Type Additional Details    [] Estim:  []Unatt       []IFC  []Premod                        []Other:  []w/ice   []w/heat  Position:  Location:    [] Estim: []Att    []TENS instruct  []NMES                    []Other:  []w/US   []w/ice   []w/heat  Position:  Location:    []  Traction: [] Cervical       []Lumbar                       [] Prone          []Supine                       []Intermittent   []Continuous Lbs:  [] before manual  [] after manual    []  Ultrasound: []Continuous   [] Pulsed                           []1MHz   []3MHz W/cm2:  Location:    []  Iontophoresis with dexamethasone         Location: [] Take home patch   [] In clinic    []  Ice     []  heat  []  Ice massage  []  Laser   []  Anodyne Position:  Location:    []  Laser with stim  []  Other:  Position:  Location:    []  Vasopneumatic Device Pressure:       [] lo [] med [] hi   Temperature: [] lo [] med [] hi   [] Skin assessment post-treatment:  []intact []redness- no adverse reaction    []redness  adverse reaction:     33 min Neuromuscular Re-education:  [x]  See flow sheet :   Rationale: increase strength, improve coordination and increase proprioception to improve the patients ability to improve scapular stability with reaching, lifting tasks    10 min Manual Therapy:  Supine Right shoulder manual stretching all planes with long axis distraction, posterior GHJ mobs/SCJ mobs grade II-III, STM to Right UT, sidelying manual scapular distraction and stretching, STM to Right infraspinatus   Rationale: decrease pain, increase ROM, increase tissue extensibility and decrease trigger points to improve shoulder mechanics with functional tasks      With   [] TE   [] TA   [] neuro   [] other: Patient Education: [x] Review HEP    [] Progressed/Changed HEP based on:   [] positioning   [] body mechanics   [] transfers   [] heat/ice application    [] other:      Other Objective/Functional Measures: added wall plank and twist, ball on wall in circles     Pain Level (0-10 scale) post treatment: 1    ASSESSMENT/Changes in Function: Patient reports improving ease with reaching across the body with Right UE. He is limited with reaching behind his back, but reports that it is slowly improving. Able to complete session today without increased pain as we decreased some repetitions to avoid overwork on shoulder. Patient will continue to benefit from skilled PT services to modify and progress therapeutic interventions, address functional mobility deficits, address ROM deficits, address strength deficits, analyze and address soft tissue restrictions, analyze and cue movement patterns, analyze and modify body mechanics/ergonomics, assess and modify postural abnormalities and instruct in home and community integration to attain remaining goals. []  See Plan of Care  []  See progress note/recertification  []  See Discharge Summary         Progress towards goals / Updated goals:  1. Pt to report FOTO score of 68 pts to show improved function and quality of life.   Re-certification: 60 points  Current status:   2. Pt to increase right shoulder Flex/ABD AROM to at least 130 degs, ER to 75 degs, IR to 55 degs without increased pain for ease reaching into cabinets, onto upper shelves. Re-certification: flex 114 deg, ABD 57 deg, IR 40 degs, ER 65 degs.   Current status:   3. Pt to report < 3/10 pain at worst to increase ease with ADLs. Re-certification: not met, 8/10 (recent fall)  Current status:   4.  Pt will report having no difficulty with reaching to a shelf that is at shoulder height with the right shoulder to improve ease of household tasks.   Re-certification: little difficulty  Current status:     PLAN  [x]  Upgrade activities as tolerated     [x]  Continue plan of care  [x]  Update interventions per flow sheet       []  Discharge due to:_  []  Other:_      Jonny Alarcon, PT 8/11/2020  2:14 PM    Future Appointments   Date Time Provider Meggan Mcclure   8/11/2020  2:15 PM Rollinsana March, PT [facility]   8/13/2020  2:15 PM Rollinsana March, PT [facility]   8/18/2020  2:15 PM Tanyaana March, PT [facility]   8/20/2020  2:15 PM Rollinsana March, PT [facility]   8/25/2020  2:15 PM Tanyaana March, PT [facility]   8/27/2020  2:15 PM Rollinsana March, PT [facility]

## 2020-08-13 ENCOUNTER — HOSPITAL ENCOUNTER (OUTPATIENT)
Dept: PHYSICAL THERAPY | Age: 70
Discharge: HOME | End: 2020-08-13
Payer: MEDICARE

## 2020-08-13 PROCEDURE — 97112 NEUROMUSCULAR REEDUCATION: CPT

## 2020-08-13 PROCEDURE — 97140 MANUAL THERAPY 1/> REGIONS: CPT

## 2020-08-13 NOTE — PROGRESS NOTES
PT DAILY TREATMENT NOTE 10-18    Patient Name: Boaz Rivers. Date:2020  : 1950  [x]  Patient  Verified  Payor: Ashanti Cones / Plan: [place] MED[place] & B / Product Type: Medicare /    In time:213  Out time:254  Total Treatment Time (min): 41  Visit #: 3 of 8    Medicare/BCBS Only   Total Timed Codes (min):  41 1:1 Treatment Time:  41       Treatment Area: Right shoulder pain [M25.511]    SUBJECTIVE  Pain Level (0-10 scale): 0-1  Any medication changes, allergies to medications, adverse drug reactions, diagnosis change, or new procedure performed?: [x] No    [] Yes (see summary sheet for update)  Subjective functional status/changes:   [] No changes reported  I changed up the order of the exercises at home and I think we are getting somewhere.      OBJECTIVE    Modality rationale: Patient declined   Min Type Additional Details    [] Estim:  []Unatt       []IFC  []Premod                        []Other:  []w/ice   []w/heat  Position:  Location:    [] Estim: []Att    []TENS instruct  []NMES                    []Other:  []w/US   []w/ice   []w/heat  Position:  Location:    []  Traction: [] Cervical       []Lumbar                       [] Prone          []Supine                       []Intermittent   []Continuous Lbs:  [] before manual  [] after manual    []  Ultrasound: []Continuous   [] Pulsed                           []1MHz   []3MHz W/cm2:  Location:    []  Iontophoresis with dexamethasone         Location: [] Take home patch   [] In clinic    []  Ice     []  heat  []  Ice massage  []  Laser   []  Anodyne Position:  Location:    []  Laser with stim  []  Other:  Position:  Location:    []  Vasopneumatic Device Pressure:       [] lo [] med [] hi   Temperature: [] lo [] med [] hi   [] Skin assessment post-treatment:  []intact []redness- no adverse reaction    []redness  adverse reaction:     29 min Neuromuscular Re-education:  [x]  See flow sheet :   Rationale: increase strength, improve coordination and increase proprioception  to improve the patients ability to improve scapular stability in order to improve ease of reaching, lifting     12 min Manual Therapy:  Supine Right shoulder manual stretching all planes with long axis distraction, posterior GHJ mobs/SCJ mobs grade II-III, STM to Right UT, sidelying manual scapular distraction and stretching, STM to Right infraspinatus   Rationale: decrease pain, increase ROM, increase tissue extensibility and decrease trigger points to improve shoulder mechanics with reaching up       With   [] TE   [] TA   [] neuro   [] other: Patient Education: [x] Review HEP    [] Progressed/Changed HEP based on:   [] positioning   [] body mechanics   [] transfers   [] heat/ice application    [] other:      Other Objective/Functional Measures: completed exercises per flow sheet     Pain Level (0-10 scale) post treatment: 0-1    ASSESSMENT/Changes in Function: Patient reports some shoulder throbbing during session today but likely related to worsening weather. He demonstrates variable improvements in objective ROM, but subjectively reports improvements since adjusting HEP and exercises at therapy. Will continue to progress as able. Patient will continue to benefit from skilled PT services to modify and progress therapeutic interventions, address functional mobility deficits, address ROM deficits, address strength deficits, analyze and address soft tissue restrictions, analyze and cue movement patterns, analyze and modify body mechanics/ergonomics, assess and modify postural abnormalities and instruct in home and community integration to attain remaining goals. []  See Plan of Care  []  See progress note/recertification  []  See Discharge Summary         Progress towards goals / Updated goals:  1. Pt to report FOTO score of 68 pts to show improved function and quality of life.   Re-certification: 60 points  Current status: reassess at MD note  2. Pt to increase right shoulder Flex/ABD AROM to at least 130 degs, ER to 75 degs, IR to 55 degs without increased pain for ease reaching into cabinets, onto upper shelves. Re-certification: flex 114 deg, ABD 57 deg, IR 40 degs, ER 65 degs.   Current status: progressing, flex 111 deg, ABD 62 deg, IR 80 deg, ER 45 deg  3. Pt to report < 3/10 pain at worst to increase ease with ADLs. Re-certification: not met, 8/10 (recent fall)  Current status: progressing, 6/10  4.  Pt will report having no difficulty with reaching to a shelf that is at shoulder height with the right shoulder to improve ease of household tasks.   Re-certification: little difficulty  Current status: reassess at [facility]  [x]  Upgrade activities as tolerated     [x]  Continue plan of care  []  Update interventions per flow sheet       []  Discharge due to:_  []  Other:_      Tez Junior, PT 8/13/2020  2:17 PM    Future Appointments   Date Time Provider Meggan Mcclure   8/18/2020  2:15 PM Shanna Champion, [facility]   8/20/2020  2:15 PM Shanna Champion, [facility]   8/25/2020  2:15 PM Shanna Champion, [facility]   8/27/2020  2:15 PM Shanna Champion, [facility]

## 2020-08-18 ENCOUNTER — HOSPITAL ENCOUNTER (OUTPATIENT)
Dept: PHYSICAL THERAPY | Age: 70
Discharge: HOME | End: 2020-08-18
Payer: MEDICARE

## 2020-08-18 PROCEDURE — 97112 NEUROMUSCULAR REEDUCATION: CPT

## 2020-08-18 PROCEDURE — 97140 MANUAL THERAPY 1/> REGIONS: CPT

## 2020-08-18 NOTE — PROGRESS NOTES
PT DAILY TREATMENT NOTE 10-18    Patient Name: Chepe Hernandez. Date:2020  : 1950  [x]  Patient  Verified  Payor: Marily Fierro / Plan: [place] MEDICARE PART A & B / Product Type: Medicare /    In time:213  Out time:300  Total Treatment Time (min): 47  Visit #: 4 of 8    Medicare/BCBS Only   Total Timed Codes (min):  47 1:1 Treatment Time:  47       Treatment Area: Right shoulder pain [M25.511]    SUBJECTIVE  Pain Level (0-10 scale): 0  Any medication changes, allergies to medications, adverse drug reactions, diagnosis change, or new procedure performed?: [x] No    [] Yes (see summary sheet for update)  Subjective functional status/changes:   [] No changes reported  No pain but I had some clicking last night and I'm worried about it.      OBJECTIVE    Modality rationale: Patient declined   Min Type Additional Details    [] Estim:  []Unatt       []IFC  []Premod                        []Other:  []w/ice   []w/heat  Position:  Location:    [] Estim: []Att    []TENS instruct  []NMES                    []Other:  []w/US   []w/ice   []w/heat  Position:  Location:    []  Traction: [] Cervical       []Lumbar                       [] Prone          []Supine                       []Intermittent   []Continuous Lbs:  [] before manual  [] after manual    []  Ultrasound: []Continuous   [] Pulsed                           []1MHz   []3MHz W/cm2:  Location:    []  Iontophoresis with dexamethasone         Location: [] Take home patch   [] In clinic    []  Ice     []  heat  []  Ice massage  []  Laser   []  Anodyne Position:  Location:    []  Laser with stim  []  Other:  Position:  Location:    []  Vasopneumatic Device Pressure:       [] lo [] med [] hi   Temperature: [] lo [] med [] hi   [] Skin assessment post-treatment:  []intact []redness- no adverse reaction    []redness  adverse reaction:     37 min Neuromuscular Re-education:  [x]  See flow sheet :   Rationale: increase strength, improve coordination and increase proprioception  to improve the patients ability to improve shoulder stability with functional tasks    10 min Manual Therapy:  Sidelying manual Right scapular distraction and stretching into elevation, STM/TrP release to Right UT/levator scapulae/infraspinatus, supine Right shoulder manual stretching into elevation with long axis distraction, inferior Right GHJ mobs grade III   Rationale: decrease pain, increase ROM, increase tissue extensibility and decrease trigger points to reduce clicking with shoulder movements        With   [] TE   [] TA   [] neuro   [] other: Patient Education: [x] Review HEP    [] Progressed/Changed HEP based on:   [] positioning   [] body mechanics   [] transfers   [] heat/ice application    [] other:      Other Objective/Functional Measures: added quadruped shoulder extension, wall plank and twist     Pain Level (0-10 scale) post treatment: 0    ASSESSMENT/Changes in Function: Patient reports non-painful clicking in his shoulder with HEP yesterday; explained that this could have been related to some inflammation or compensatory movements. Able to progress routine this afternoon with reports of now pain afterward. Patient will continue to benefit from skilled PT services to modify and progress therapeutic interventions, address functional mobility deficits, address ROM deficits, address strength deficits, analyze and address soft tissue restrictions, analyze and cue movement patterns, analyze and modify body mechanics/ergonomics, assess and modify postural abnormalities and instruct in home and community integration to attain remaining goals. []  See Plan of Care  []  See progress note/recertification  []  See Discharge Summary         Progress towards goals / Updated goals:  1. Pt to report FOTO score of 68 pts to show improved function and quality of life.   Re-certification: 60 points  Current status: reassess at MD note  2. Pt to increase right shoulder Flex/ABD AROM to at least 130 degs, ER to 75 degs, IR to 55 degs without increased pain for ease reaching into cabinets, onto upper shelves. Re-certification: flex 114 deg, ABD 57 deg, IR 40 degs, ER 65 degs.   Current status: progressing, flex 111 deg, ABD 62 deg, IR 80 deg, ER 45 deg  3. Pt to report < 3/10 pain at worst to increase ease with ADLs. Re-certification: not met, 8/10 (recent fall)  Current status: progressing, 6/10  4.  Pt will report having no difficulty with reaching to a shelf that is at shoulder height with the right shoulder to improve ease of household tasks.   Re-certification: little difficulty  Current status: reassess at [facility]  [x]  Upgrade activities as tolerated     [x]  Continue plan of care  []  Update interventions per flow sheet       []  Discharge due to:_  []  Other:_      Blair Case PT 8/18/2020  2:17 PM    Future Appointments   Date Time Provider Meggan Mcclure   8/20/2020  2:15 PM Scott Mcmahon [facility]   8/25/2020  2:15 PM Blair Morejon PT [facility]   8/27/2020  2:15 PM Blair Morejon PT [facility]

## 2020-08-20 ENCOUNTER — HOSPITAL ENCOUNTER (OUTPATIENT)
Dept: PHYSICAL THERAPY | Age: 70
Discharge: HOME | End: 2020-08-20
Payer: MEDICARE

## 2020-08-20 PROCEDURE — 97112 NEUROMUSCULAR REEDUCATION: CPT

## 2020-08-20 PROCEDURE — 97140 MANUAL THERAPY 1/> REGIONS: CPT

## 2020-08-20 NOTE — PROGRESS NOTES
PT DAILY TREATMENT NOTE 10-18    Patient Name: Twan Graves. Date:2020  : 1950  [x]  Patient  Verified  Payor: Rita Sandhu / Plan: [place] MEDICARE PART A & B / Product Type: Medicare /    In time:213  Out time:259  Total Treatment Time (min): 46  Visit #: 5 of 8    Medicare/BCBS Only   Total Timed Codes (min):  46 1:1 Treatment Time:  46       Treatment Area: Right shoulder pain [M25.511]    SUBJECTIVE  Pain Level (0-10 scale): 1  Any medication changes, allergies to medications, adverse drug reactions, diagnosis change, or new procedure performed?: [x] No    [] Yes (see summary sheet for update)  Subjective functional status/changes:   [] No changes reported  The shoulder feels good today.      OBJECTIVE    Modality rationale:    Min Type Additional Details    [] Estim:  []Unatt       []IFC  []Premod                        []Other:  []w/ice   []w/heat  Position:  Location:    [] Estim: []Att    []TENS instruct  []NMES                    []Other:  []w/US   []w/ice   []w/heat  Position:  Location:    []  Traction: [] Cervical       []Lumbar                       [] Prone          []Supine                       []Intermittent   []Continuous Lbs:  [] before manual  [] after manual    []  Ultrasound: []Continuous   [] Pulsed                           []1MHz   []3MHz W/cm2:  Location:    []  Iontophoresis with dexamethasone         Location: [] Take home patch   [] In clinic    []  Ice     []  heat  []  Ice massage  []  Laser   []  Anodyne Position:  Location:    []  Laser with stim  []  Other:  Position:  Location:    []  Vasopneumatic Device Pressure:       [] lo [] med [] hi   Temperature: [] lo [] med [] hi   [] Skin assessment post-treatment:  []intact []redness- no adverse reaction    []redness  adverse reaction:     36 min Neuromuscular Re-education:  [x]  See flow sheet :   Rationale: increase strength, improve coordination and increase proprioception  to improve the patients ability to improve shoulder stability with household tasks    10 min Manual Therapy:  Supine posterior SCJ mobs Right grade II-III, manual stretching Right shoulder all planes, grade II-III inferior Right GHJ mobs, STM to Right pectorals, sidelying scapular manual distraction and stretching, STM/TrP release to Right UT/levator scapulae/infraspinatus    Rationale: decrease pain, increase ROM, increase tissue extensibility and decrease trigger points to improve scapular mechanics in order to reduce compensation with movement           With   [] TE   [] TA   [] neuro   [] other: Patient Education: [x] Review HEP    [] Progressed/Changed HEP based on:   [] positioning   [] body mechanics   [] transfers   [] heat/ice application    [] other:      Other Objective/Functional Measures: held quadruped activities due to LE pain while adjusting to new inserts      Pain Level (0-10 scale) post treatment: 1    ASSESSMENT/Changes in Function: Patient demonstrates good improvements in Right shoulder AROM after progressing to more stability-based routine. Currently Patient does report that he is not where he would like to be with shoulder mobility, and is not able to lift overhead whereas prior to fall he was able to. He believes that fall about a month ago has also set him back, and while he has returned to improvement level where he was prior to most recent fall, he is not yet to where he was before original injury. Will progress and maximize improvements as much as possible. Patient will continue to benefit from skilled PT services to modify and progress therapeutic interventions, address functional mobility deficits, address ROM deficits, address strength deficits, analyze and address soft tissue restrictions, analyze and cue movement patterns, analyze and modify body mechanics/ergonomics, assess and modify postural abnormalities and instruct in home and community integration to attain remaining goals.      []  See Plan of Care  []  See progress note/recertification  []  See Discharge Summary         Progress towards goals / Updated goals:  1. Pt to report FOTO score of 68 pts to show improved function and quality of life. Re-certification: 60 points  Current status: reassess at MD note  2. Pt to increase right shoulder Flex/ABD AROM to at least 130 degs, ER to 75 degs, IR to 55 degs without increased pain for ease reaching into cabinets, onto upper shelves. Re-certification: flex 114 deg, ABD 57 deg, IR 40 degs, ER 65 degs.   Current status: progressing, flex 122 deg, ABD 82 deg, IR 80 deg, ER 50 deg  3. Pt to report < 3/10 pain at worst to increase ease with ADLs. Re-certification: not met, 8/10 (recent fall)  Current status: progressing, 3/10  4.  Pt will report having no difficulty with reaching to a shelf that is at shoulder height with the right shoulder to improve ease of household tasks.   Re-certification: little difficulty  Current status: reassess at [facility]  [x]  Upgrade activities as tolerated     [x]  Continue plan of care  []  Update interventions per flow sheet       []  Discharge due to:_  []  Other:_      Elizabeth Rasmussen, PT 8/20/2020  2:18 PM    Future Appointments   Date Time Provider Meggan Mcclure   8/25/2020  2:15 PM Sobeida Julian, PT [facility]   8/27/2020  2:15 PM Sobeida Julian, PT [facility]

## 2020-08-25 ENCOUNTER — HOSPITAL ENCOUNTER (OUTPATIENT)
Dept: PHYSICAL THERAPY | Age: 70
Discharge: HOME | End: 2020-08-25
Payer: MEDICARE

## 2020-08-25 PROCEDURE — 97140 MANUAL THERAPY 1/> REGIONS: CPT

## 2020-08-25 PROCEDURE — 97112 NEUROMUSCULAR REEDUCATION: CPT

## 2020-08-25 NOTE — PROGRESS NOTES
PT DAILY TREATMENT NOTE 10-18    Patient Name: Elizabeth Liu. Date:2020  : 1950  [x]  Patient  Verified  Payor: [place] MEDICARE / Plan: [place] MEDICARE PART A & B / Product Type: Medicare /    In time:214  Out time:259  Total Treatment Time (min): 45  Visit #: 6 of 8    Medicare/BCBS Only   Total Timed Codes (min):  45 1:1 Treatment Time:  45       Treatment Area: Right shoulder pain [M25.511]    SUBJECTIVE  Pain Level (0-10 scale): 0  Any medication changes, allergies to medications, adverse drug reactions, diagnosis change, or new procedure performed?: [x] No    [] Yes (see summary sheet for update)  Subjective functional status/changes:   [] No changes reported  I'm feeling pretty good. I don't have any continuous pain but I do have pain once I get to the end point.      OBJECTIVE    Modality rationale:    Min Type Additional Details    [] Estim:  []Unatt       []IFC  []Premod                        []Other:  []w/ice   []w/heat  Position:  Location:    [] Estim: []Att    []TENS instruct  []NMES                    []Other:  []w/US   []w/ice   []w/heat  Position:  Location:    []  Traction: [] Cervical       []Lumbar                       [] Prone          []Supine                       []Intermittent   []Continuous Lbs:  [] before manual  [] after manual    []  Ultrasound: []Continuous   [] Pulsed                           []1MHz   []3MHz W/cm2:  Location:    []  Iontophoresis with dexamethasone         Location: [] Take home patch   [] In clinic    []  Ice     []  heat  []  Ice massage  []  Laser   []  Anodyne Position:  Location:    []  Laser with stim  []  Other:  Position:  Location:    []  Vasopneumatic Device Pressure:       [] lo [] med [] hi   Temperature: [] lo [] med [] hi   [] Skin assessment post-treatment:  []intact []redness- no adverse reaction    []redness  adverse reaction:     35 min Neuromuscular Re-education:  [x]  See flow sheet :   Rationale: increase strength, improve coordination and increase proprioception  to improve the patients ability to improve shoulder and scapular stability with daily tasks    10 min Manual Therapy:  Supine manual stretching Right shoulder all planes, grade II-III inferior Right GHJ mobs, STM to Right pectorals, lateral subscapularis release, sidelying scapular manual distraction and stretching, STM/TrP release to Right UT/levator scapulae/infraspinatus    Rationale: decrease pain, increase ROM, increase tissue extensibility and decrease trigger points to improve shoulder mechanics with functional tasks in order to improve ROM and reduce compensation     With   [] TE   [] TA   [] neuro   [] other: Patient Education: [x] Review HEP    [] Progressed/Changed HEP based on:   [] positioning   [] body mechanics   [] transfers   [] heat/ice application    [] other:      Other Objective/Functional Measures: added 90/90 shoulder IR/ER with band     Pain Level (0-10 scale) post treatment: 0    ASSESSMENT/Changes in Function: Discussed progress with Patient; he would like to be even further along but is agreeable to considering discharge at end of certification period, as he is very compliant with HEP. We will see how ROM changes in the remaining three visits and determine need for continuation or discharge. Patient will continue to benefit from skilled PT services to modify and progress therapeutic interventions, address functional mobility deficits, address ROM deficits, address strength deficits, analyze and address soft tissue restrictions, analyze and cue movement patterns, analyze and modify body mechanics/ergonomics, assess and modify postural abnormalities and instruct in home and community integration to attain remaining goals.      []  See Plan of Care  []  See progress note/recertification  []  See Discharge Summary         Progress towards goals / Updated goals:  1. Pt to report FOTO score of 68 pts to show improved function and quality of life.  Re-certification: 60 points  Current status: reassess at MD note  2. Pt to increase right shoulder Flex/ABD AROM to at least 130 degs, ER to 75 degs, IR to 55 degs without increased pain for ease reaching into cabinets, onto upper shelves. Re-certification: flex 114 deg, ABD 57 deg, IR 40 degs, ER 65 degs.   Current status: progressing, flex 122 deg, ABD 82 deg, IR 80 deg, ER 50 deg  3. Pt to report < 3/10 pain at worst to increase ease with ADLs. Re-certification: not met, 8/10 (recent fall)  Current status: progressing, 3/10  4.  Pt will report having no difficulty with reaching to a shelf that is at shoulder height with the right shoulder to improve ease of household tasks.   Re-certification: little difficulty  Current status: reassess at [facility] [place]  [x]  Upgrade activities as tolerated     [x]  Continue plan of care  [x]  Update interventions per flow sheet       []  Discharge due to:_  []  Other:_      Brandyn Uribe PT 8/25/2020  2:15 PM    Future Appointments   Date Time Provider Meggan Mcclure   8/27/2020  2:15 PM Francisco Javier Shipman, PT [facility]

## 2020-08-27 ENCOUNTER — HOSPITAL ENCOUNTER (OUTPATIENT)
Dept: PHYSICAL THERAPY | Age: 70
Discharge: HOME | End: 2020-08-27
Payer: MEDICARE

## 2020-08-27 PROCEDURE — 97140 MANUAL THERAPY 1/> REGIONS: CPT

## 2020-08-27 PROCEDURE — 97112 NEUROMUSCULAR REEDUCATION: CPT

## 2020-08-27 NOTE — PROGRESS NOTES
PT DAILY TREATMENT NOTE 10-18    Patient Name: Eusebio Dee. Date:2020  : 1950  [x]  Patient  Verified  Payor: Wilder Maldonado / Plan: [place] MED[place] PART A & B / Product Type: Medicare /    In time:215  Out time:259  Total Treatment Time (min): 44  Visit #: 7 of 8    Medicare/BCBS Only   Total Timed Codes (min):  44 1:1 Treatment Time:  44       Treatment Area: Right shoulder pain [M25.511]    SUBJECTIVE  Pain Level (0-10 scale): 0  Any medication changes, allergies to medications, adverse drug reactions, diagnosis change, or new procedure performed?: [x] No    [] Yes (see summary sheet for update)  Subjective functional status/changes:   [] No changes reported  The shoulder feels good.      OBJECTIVE    Modality rationale:    Min Type Additional Details    [] Estim:  []Unatt       []IFC  []Premod                        []Other:  []w/ice   []w/heat  Position:  Location:    [] Estim: []Att    []TENS instruct  []NMES                    []Other:  []w/US   []w/ice   []w/heat  Position:  Location:    []  Traction: [] Cervical       []Lumbar                       [] Prone          []Supine                       []Intermittent   []Continuous Lbs:  [] before manual  [] after manual    []  Ultrasound: []Continuous   [] Pulsed                           []1MHz   []3MHz W/cm2:  Location:    []  Iontophoresis with dexamethasone         Location: [] Take home patch   [] In clinic    []  Ice     []  heat  []  Ice massage  []  Laser   []  Anodyne Position:  Location:    []  Laser with stim  []  Other:  Position:  Location:    []  Vasopneumatic Device Pressure:       [] lo [] med [] hi   Temperature: [] lo [] med [] hi   [] Skin assessment post-treatment:  []intact []redness- no adverse reaction    []redness  adverse reaction:     29 min Neuromuscular Re-education:  [x]  See flow sheet :   Rationale: increase strength, improve coordination and increase proprioception  to improve the patients ability to improve shoulder stability with functional tasks    15 min Manual Therapy:  Sidelying Right scapular manual stretching and distraction, STM/TrP release to Right UT/levator scapulae/infraspinatus, supine manual stretching to Right shoulder all planes with long axis distraction,lateral subscapularis release, inferior/posterior Right GHJ mobs grade II-III, PA mobs Right SCJ mobs grade II   Rationale: decrease pain, increase ROM, increase tissue extensibility and decrease trigger points to improve ease of reaching overhead/behind the back, reduce compensation with daily tasks       With   [] TE   [] TA   [] neuro   [] other: Patient Education: [x] Review HEP    [] Progressed/Changed HEP based on:   [] positioning   [] body mechanics   [] transfers   [] heat/ice application    [] other:      Other Objective/Functional Measures: progressed resistance band exercises per flow sheet     Pain Level (0-10 scale) post treatment: 1    ASSESSMENT/Changes in Function: Patient continues to be independent and compliant with HEP but does have some lingering ROM deficits. Will cancel next session and see him again in one week and determine readiness to discharge. Patient will continue to benefit from skilled PT services to modify and progress therapeutic interventions, address functional mobility deficits, address ROM deficits, address strength deficits, analyze and address soft tissue restrictions, analyze and cue movement patterns, analyze and modify body mechanics/ergonomics, assess and modify postural abnormalities and instruct in home and community integration to attain remaining goals. []  See Plan of Care  []  See progress note/recertification  []  See Discharge Summary         Progress towards goals / Updated goals:  1. Pt to report FOTO score of 68 pts to show improved function and quality of life.   Re-certification: 60 points  Current status: reassess at MD note  2. Pt to increase right shoulder Flex/ABD AROM to at least 130 degs, ER to 75 degs, IR to 55 degs without increased pain for ease reaching into cabinets, onto upper shelves. Re-certification: flex 114 deg, ABD 57 deg, IR 40 degs, ER 65 degs.   Current status: progressing, flex 124 deg (with pain), ABD 104 deg, IR 80 deg, ER 65 deg  3. Pt to report < 3/10 pain at worst to increase ease with ADLs. Re-certification: not met, 8/10 (recent fall)  Current status: progressing, 4-6/10 (with pushing exercises)  4.  Pt will report having no difficulty with reaching to a shelf that is at shoulder height with the right shoulder to improve ease of household tasks.   Re-certification: little difficulty  Current status: reassess at [facility]  []  Upgrade activities as tolerated     [x]  Continue plan of care  []  Update interventions per flow sheet       []  Discharge due to:_  []  Other:_      Rosamaria Gilliland PT 8/27/2020  2:11 PM    Future Appointments   Date Time Provider Meggan Mcclure   8/27/2020  2:15 PM Vivian [facility]   9/1/2020  8:15 AM Ann Love PT [facility]   9/3/2020  8:15 AM Ann Love PT [facility]

## 2020-09-01 ENCOUNTER — APPOINTMENT (OUTPATIENT)
Dept: PHYSICAL THERAPY | Age: 70
End: 2020-09-01
Payer: MEDICARE

## 2020-09-03 ENCOUNTER — HOSPITAL ENCOUNTER (OUTPATIENT)
Dept: PHYSICAL THERAPY | Age: 70
Discharge: HOME | End: 2020-09-03
Payer: MEDICARE

## 2020-09-03 PROCEDURE — 97140 MANUAL THERAPY 1/> REGIONS: CPT

## 2020-09-03 PROCEDURE — 97110 THERAPEUTIC EXERCISES: CPT

## 2020-09-03 NOTE — PROGRESS NOTES
Physical Therapy Discharge Instructions      [facility] [address] [place] [address]  [phone] fax    Patient: Kassandra Edouard. : 1950      Continue Home Exercise Program (not all of the exercises but at least the stretches, low resistance ones daily) 1-2 times per day for 2-3 weeks, then decrease to 3-4 times per week      Continue with    [x] Ice  as needed 1-2 times per day     [] Heat           Follow up with MD:     [] Upon completion of therapy     [x] As needed      Recommendations:     [x]   Return to activity with home program    []   Return to activity with the following modifications:       []Post Rehab Program    []Join Independent aquatic program     []Return to/join local gym    Additional Comments: Keep up with the exercises but be smart! Do some warmup activities (wall push ups, rows) before trying to lift overhead and let pain be your guide. Stop if you feel yourself compensating. Call me if you need me!     Lakshmi Washburn, PT 9/3/2020 8:15 AM

## 2020-09-03 NOTE — PROGRESS NOTES
PT DISCHARGE DAILY NOTE AND [id number]    Patient name: Mark Cardoza. Start of Care: 2020   Referral source: Cathy Jhonson MD : 1950               Medical Diagnosis: Right shoulder pain [M25.511]  Payor: [employer]ICARE / Plan: [place] [employer]OSS MEDICARE PPO / Product Type: Managed Care Medicare /  Onset Date:2020               Treatment Diagnosis: Right Shoulder pain   Prior Hospitalization: see medical history Provider#: [id number]   Medications: Verified on Patient summary List    Comorbidities: BMI > 30; COPD; HTN; hx Polio; Latex allergy   Prior Level of Function: Retired; lives with spouse; functionally independent; uses SPC for balance    Visits from Start of Care: 17    Missed Visits: 0    Reporting Period : 2020 to 9/3/2020    Date:9/3/2020  : 1950  [x]  Patient  Verified  Payor: [place] MEDICARE / Plan: [place] MEDICARE PART A & B / Product Type: Medicare /    In time:808  Out time:854  Total Treatment Time (min): 46  Visit #: 8 of 8    Medicare/BCBS Only   Total Timed Codes (min):  46 1:1 Treatment Time:  46       SUBJECTIVE  Pain Level (0-10 scale): 0  Any medication changes, allergies to medications, adverse drug reactions, diagnosis change, or new procedure performed?: [x] No    [] Yes (see summary sheet for update)  Subjective functional status/changes:   [] No changes reported  I feel good. I'm anticipating to be able to push and move but I know I just wear myself out. So I know it's going to be months.      OBJECTIVE    Modality rationale:    Min Type Additional Details    [] Estim:  []Unatt       []IFC  []Premod                        []Other:  []w/ice   []w/heat  Position:  Location:    [] Estim: []Att    []TENS instruct  []NMES                    []Other:  []w/US   []w/ice   []w/heat  Position:  Location:    []  Traction: [] Cervical       []Lumbar                       [] Prone          []Supine                       []Intermittent   []Continuous Lbs:  [] before manual  [] after manual    []  Ultrasound: []Continuous   [] Pulsed                           []1MHz   []3MHz W/cm2:  Location:    []  Iontophoresis with dexamethasone         Location: [] Take home patch   [] In clinic    []  Ice     []  heat  []  Ice massage  []  Laser   []  Anodyne Position:  Location:    []  Laser with stim  []  Other:  Position:  Location:    []  Vasopneumatic Device Pressure:       [] lo [] med [] hi   Temperature: [] lo [] med [] hi   [] Skin assessment post-treatment:  []intact []redness- no adverse reaction    []redness  adverse reaction:     36 min Therapeutic Exercise:  [x] See flow sheet :HEP review   Rationale: increase ROM and increase strength to improve the patients ability to improve compliance with HEP    10 min Manual Therapy:  Sidelying Right scapular manual distractino and stretching, STM to Right levator scapulae/infraspinatus, supine manual stretching Right GHJ mobs all planes with long axis distraction and oscillations, lateral Right subscapularis release   Rationale: decrease pain, increase ROM, increase tissue extensibility and decrease trigger points to improve shoulder mechanics with functional tasks       With   [] TE   [] TA   [] neuro   [] other: Patient Education: [x] Review HEP    [] Progressed/Changed HEP based on:   [] positioning   [] body mechanics   [] transfers   [] heat/ice application    [] other:      Other Objective/Functional Measures: reviewed HEP     Pain Level (0-10 scale) post treatment: 0    Summary of Care:  1. Pt to report FOTO score of 68 pts to show improved function and quality of life. Re-certification: 60 points  Current status: progressing, 61 pts  2. Pt to increase right shoulder Flex/ABD AROM to at least 130 degs, ER to 75 degs, IR to 55 degs without increased pain for ease reaching into cabinets, onto upper shelves.   Re-certification: flex 114 deg, ABD 57 deg, IR 40 degs, ER 65 degs.   Current status: progressing, flex 126 deg (with pain), ABD 104 deg, IR 80 deg, ER 65 deg  3. Pt to report < 3/10 pain at worst to increase ease with ADLs. Re-certification: not met, 8/10 (recent fall)  Current status: progressing, 3/10  4. Pt will report having no difficulty with reaching to a shelf that is at shoulder height with the right shoulder to improve ease of household tasks.   Re-certification: little difficulty  Current status: met    ASSESSMENT/Changes in Function: Patient has consistently attended therapy for Right shoulder pain and decreased ROM. Despite set back early on due to a fall, he ha improved his ROM and reports much improved functional abilities. At this time he is independent and compliant with HEP, and is appropriate for discharge.      Thank you for this referral!      PLAN  [x]Discontinue therapy: [x]Patient has reached or is progressing toward set goals      []Patient is non-compliant or has abdicated      []Due to lack of appreciable progress towards set [facility], PT 9/3/2020  8:11 AM